# Patient Record
Sex: FEMALE | Race: WHITE | NOT HISPANIC OR LATINO | Employment: FULL TIME | URBAN - NONMETROPOLITAN AREA
[De-identification: names, ages, dates, MRNs, and addresses within clinical notes are randomized per-mention and may not be internally consistent; named-entity substitution may affect disease eponyms.]

---

## 2023-02-07 ENCOUNTER — OFFICE VISIT (OUTPATIENT)
Dept: ENDOCRINOLOGY | Facility: CLINIC | Age: 43
End: 2023-02-07
Payer: COMMERCIAL

## 2023-02-07 VITALS
SYSTOLIC BLOOD PRESSURE: 147 MMHG | BODY MASS INDEX: 44.1 KG/M2 | HEIGHT: 63 IN | OXYGEN SATURATION: 98 % | HEART RATE: 114 BPM | WEIGHT: 248.9 LBS | DIASTOLIC BLOOD PRESSURE: 90 MMHG

## 2023-02-07 DIAGNOSIS — R79.89 ELEVATED PROLACTIN LEVEL: Primary | ICD-10-CM

## 2023-02-07 DIAGNOSIS — D35.2 PROLACTINOMA: ICD-10-CM

## 2023-02-07 DIAGNOSIS — F41.9 ANXIETY: ICD-10-CM

## 2023-02-07 PROCEDURE — 99204 OFFICE O/P NEW MOD 45 MIN: CPT | Performed by: NURSE PRACTITIONER

## 2023-02-07 RX ORDER — DIPHENHYDRAMINE HCL 25 MG
25 CAPSULE ORAL AS NEEDED
COMMUNITY

## 2023-02-07 RX ORDER — LORATADINE 10 MG/1
10 CAPSULE, LIQUID FILLED ORAL AS NEEDED
COMMUNITY

## 2023-02-07 RX ORDER — ATORVASTATIN CALCIUM 20 MG/1
20 TABLET, FILM COATED ORAL NIGHTLY
COMMUNITY
Start: 2023-01-27

## 2023-02-07 RX ORDER — PRAZOSIN HYDROCHLORIDE 1 MG/1
1 CAPSULE ORAL EVERY 24 HOURS
COMMUNITY

## 2023-02-07 RX ORDER — LAMOTRIGINE 25 MG/1
25 TABLET ORAL 3 TIMES DAILY
COMMUNITY
Start: 2022-12-28

## 2023-02-07 RX ORDER — MULTIPLE VITAMINS W/ MINERALS TAB 9MG-400MCG
1 TAB ORAL DAILY
COMMUNITY

## 2023-02-07 NOTE — PROGRESS NOTES
"Chief Complaint  Pituitary Problem    Subjective          Ember Hernandez presents to [facility]  History of Present Illness       In office visit     Referring provider JAZ Underwood       Chief Complaint   Patient presents with   • Pituitary Problem       HPI      42 year old female presents for consultation       Reason --  Elevated prolactin , history of pituitary tumor    Patient states she had a history of pituitary tumor was treated with medication and then eventually developed papilledema and now has shunt placed    She states the medication was very expensive at the time and she was following at [facility]    She has severe anxiety and states she will not have another MRI    She recently had labs with primary with a mild elevation of prolactin of 30    She does not want to be here today and states if labs are okay she would like to not return    Symptoms include anxiety and difficulty losing weight, heat intolerance    She denies double vision or blurred vision, no headaches    She does have periods that are regular    No children    Does not does not desire pregnancy    Denies galactorrhea          Shunt placed -- 8 years ago       LMP --   Jan. 2023       Review of Systems - General ROS: positive for  - fatigue and weight gain                  Objective   Vital Signs:   /90   Pulse 114   Ht 160 cm (63\")   Wt 113 kg (248 lb 14.4 oz)   SpO2 98%   BMI 44.09 kg/m²     Physical Exam  Constitutional:       Appearance: Normal appearance.   Neurological:      General: No focal deficit present.      Mental Status: She is alert.   Psychiatric:         Thought Content: Thought content normal.         Judgment: Judgment normal.        Result Review :   The following data was reviewed by: JAZ Torre on 02/07/2023:                Assessment and Plan    Diagnoses and all orders for this visit:    1. Elevated prolactin level (Primary)  -     ACTH; " Future  -     Cortisol - AM; Future  -     DHEA-Sulfate; Future  -     Estradiol; Future  -     FSH & LH; Future  -     Prolactin; Future  -     Progesterone; Future  -     Testosterone, Total, Women, Children, and Hypogonadal Males; Future  -     Insulin-like Growth Factor; Future  -     T4, Free; Future  -     TSH; Future  -     CBC & Differential; Future  -     Comprehensive Metabolic Panel; Future    2. Prolactinoma (HCC)  -     ACTH; Future  -     Cortisol - AM; Future  -     DHEA-Sulfate; Future  -     Estradiol; Future  -     FSH & LH; Future  -     Prolactin; Future  -     Progesterone; Future  -     Testosterone, Total, Women, Children, and Hypogonadal Males; Future  -     Insulin-like Growth Factor; Future  -     T4, Free; Future  -     TSH; Future  -     CBC & Differential; Future  -     Comprehensive Metabolic Panel; Future    3. Anxiety                 History of pituitary tumor    Elevated prolactin    Patient states she will not have another MRI    She does not want any treatment unless necessary    She is agreeable to repeat blood work she will do labs in the morning we will check the pituitary axis    We did discuss the reasons to treat prolactin with medication    She does not desire pregnancy, she does not have breast discharge    There is a history of pituitary tumor I have no idea to know if it still there or increase in size    Anxiety    She will follow with her primary    I will call her as soon as labs are resulted if everything is normal we will cancel her follow-up appointment    Records from Mrs. [facility] received and reviewed from 2022    Thank you for this consultation              Follow Up   Return in about 5 months (around 7/7/2023).  Patient was given instructions and counseling regarding her condition or for health maintenance advice. Please see specific information pulled into the AVS if appropriate.         This document has been electronically signed by JAZ Torre  on February 7, 2023 16:04 CST.

## 2023-05-30 ENCOUNTER — TELEPHONE (OUTPATIENT)
Dept: ENDOCRINOLOGY | Facility: CLINIC | Age: 43
End: 2023-05-30
Payer: COMMERCIAL

## 2023-05-30 NOTE — TELEPHONE ENCOUNTER
DISREGARD. PT HAS A FOLLOW UP SCHEDULED. SORRY   Pt called asking if she needs to be seen again. Pt would like to know what the next steps are, if any are needed. Pt would like a call back.    Please advise    Thank you

## 2023-05-31 ENCOUNTER — TELEPHONE (OUTPATIENT)
Dept: ENDOCRINOLOGY | Facility: CLINIC | Age: 43
End: 2023-05-31

## 2023-05-31 NOTE — TELEPHONE ENCOUNTER
Pt would like lab orders sent to [facility] in [place], [place].  Pt stated she never got a call about her previous labs. She would like a call to go over those.    Please advise    Thank you

## 2023-05-31 NOTE — TELEPHONE ENCOUNTER
Send active labs Her thyroid was normal Blood count normal Prolactin mild elevation at 28 and normal is 23 No treatment indicated at this time , she told me she did not want another MRI or medications Do labs one week prior to next appt       Ember voiced understanding    New lab orders faxed to [facility]

## 2023-06-01 DIAGNOSIS — R79.89 ELEVATED PROLACTIN LEVEL: ICD-10-CM

## 2023-06-01 DIAGNOSIS — D35.2 PROLACTINOMA: ICD-10-CM
